# Patient Record
Sex: FEMALE | URBAN - METROPOLITAN AREA
[De-identification: names, ages, dates, MRNs, and addresses within clinical notes are randomized per-mention and may not be internally consistent; named-entity substitution may affect disease eponyms.]

---

## 2019-04-10 NOTE — PATIENT DISCUSSION
The patient was here for a vision care examination. There were no untoward findings noted. Repeat evaluation in one year is indicated.

## 2019-09-10 ENCOUNTER — PREPPED CHART (OUTPATIENT)
Dept: URBAN - METROPOLITAN AREA CLINIC 43 | Facility: CLINIC | Age: 64
End: 2019-09-10

## 2022-10-24 ENCOUNTER — NEW PATIENT (OUTPATIENT)
Dept: URBAN - METROPOLITAN AREA CLINIC 38 | Facility: CLINIC | Age: 67
End: 2022-10-24

## 2022-10-24 DIAGNOSIS — H43.811: ICD-10-CM

## 2022-10-24 DIAGNOSIS — H25.813: ICD-10-CM

## 2022-10-24 DIAGNOSIS — H04.123: ICD-10-CM

## 2022-10-24 DIAGNOSIS — H52.7: ICD-10-CM

## 2022-10-24 PROCEDURE — 99204 OFFICE O/P NEW MOD 45 MIN: CPT

## 2022-10-24 PROCEDURE — 92134 CPTRZ OPH DX IMG PST SGM RTA: CPT

## 2022-10-24 PROCEDURE — 92015 DETERMINE REFRACTIVE STATE: CPT

## 2022-10-24 ASSESSMENT — VISUAL ACUITY
OD_PH: 20/60-1
OS_BAT: 20/60
OS_SC: 20/200
OU_CC: J12-
OU_SC: J10
OD_CC: J12-
OD_SC: J10
OS_CC: 20/200
OS_SC: J10
OU_CC: 20/100
OD_SC: 20/200
OS_PH: 20/40
OU_SC: 20/100-1
OD_CC: 20/200
OS_CC: J12-
OD_BAT: 20/80-

## 2022-10-24 ASSESSMENT — TONOMETRY
OD_IOP_MMHG: 14
OS_IOP_MMHG: 13

## 2022-11-15 ENCOUNTER — CONSULTATION/EVALUATION (OUTPATIENT)
Dept: URBAN - METROPOLITAN AREA CLINIC 43 | Facility: CLINIC | Age: 67
End: 2022-11-15

## 2022-11-15 DIAGNOSIS — H25.813: ICD-10-CM

## 2022-11-15 DIAGNOSIS — H52.7: ICD-10-CM

## 2022-11-15 DIAGNOSIS — H04.123: ICD-10-CM

## 2022-11-15 DIAGNOSIS — H43.811: ICD-10-CM

## 2022-11-15 PROCEDURE — 92025 CPTRIZED CORNEAL TOPOGRAPHY: CPT

## 2022-11-15 PROCEDURE — V2799PMN IMPRIMIS PRED-MOXI-NEPAF 5ML

## 2022-11-15 PROCEDURE — 99214 OFFICE O/P EST MOD 30 MIN: CPT

## 2022-11-15 PROCEDURE — 92136TC INTERFEROMETRY - TECHNICAL COMPONENT

## 2022-11-15 ASSESSMENT — TONOMETRY
OS_IOP_MMHG: 18
OD_IOP_MMHG: 18

## 2022-11-15 ASSESSMENT — VISUAL ACUITY
OS_SC: 20/200+2
OS_SC: J5
OD_SC: 20/200+2
OD_SC: J6

## 2022-11-18 ENCOUNTER — PRE-OP/H&P (OUTPATIENT)
Dept: URBAN - METROPOLITAN AREA SURGERY 14 | Facility: SURGERY | Age: 67
End: 2022-11-18

## 2022-11-18 ENCOUNTER — TECH ONLY (OUTPATIENT)
Dept: URBAN - METROPOLITAN AREA CLINIC 39 | Facility: CLINIC | Age: 67
End: 2022-11-18

## 2022-11-18 ENCOUNTER — SURGERY/PROCEDURE (OUTPATIENT)
Dept: URBAN - METROPOLITAN AREA CLINIC 43 | Facility: CLINIC | Age: 67
End: 2022-11-18

## 2022-11-18 DIAGNOSIS — H52.7: ICD-10-CM

## 2022-11-18 DIAGNOSIS — H25.813: ICD-10-CM

## 2022-11-18 DIAGNOSIS — Z96.1: ICD-10-CM

## 2022-11-18 DIAGNOSIS — H04.123: ICD-10-CM

## 2022-11-18 DIAGNOSIS — H43.811: ICD-10-CM

## 2022-11-18 PROCEDURE — 99211HP H&P OFFICE/OUTPATIENT VISIT, EST

## 2022-11-18 PROCEDURE — 99211T TECH SERVICE

## 2022-11-18 PROCEDURE — 66984 XCAPSL CTRC RMVL W/O ECP: CPT

## 2022-11-18 ASSESSMENT — VISUAL ACUITY
OD_SC: 20/100
OD_SC: J12

## 2022-11-18 ASSESSMENT — TONOMETRY: OD_IOP_MMHG: 13

## 2022-11-21 ENCOUNTER — POST-OP (OUTPATIENT)
Dept: URBAN - METROPOLITAN AREA CLINIC 38 | Facility: CLINIC | Age: 67
End: 2022-11-21

## 2022-11-21 DIAGNOSIS — Z96.1: ICD-10-CM

## 2022-11-21 PROCEDURE — 99024 POSTOP FOLLOW-UP VISIT: CPT

## 2022-11-21 ASSESSMENT — TONOMETRY
OS_IOP_MMHG: 14
OD_IOP_MMHG: 14

## 2022-11-21 ASSESSMENT — VISUAL ACUITY
OD_SC: J3
OS_SC: J4
OD_SC: 20/60+2
OS_SC: 20/40-2

## 2022-11-28 ENCOUNTER — POST OP/EVAL OF SECOND EYE (OUTPATIENT)
Dept: URBAN - METROPOLITAN AREA CLINIC 38 | Facility: CLINIC | Age: 67
End: 2022-11-28

## 2022-11-28 DIAGNOSIS — H25.812: ICD-10-CM

## 2022-11-28 PROCEDURE — 99213 OFFICE O/P EST LOW 20 MIN: CPT

## 2022-11-28 ASSESSMENT — VISUAL ACUITY
OD_SC: J2
OS_SC: J4
OD_SC: 20/80
OS_SC: 20/40

## 2022-11-28 ASSESSMENT — TONOMETRY
OS_IOP_MMHG: 18
OD_IOP_MMHG: 18

## 2022-12-02 ENCOUNTER — POST-OP (OUTPATIENT)
Dept: URBAN - METROPOLITAN AREA SURGERY 14 | Facility: SURGERY | Age: 67
End: 2022-12-02

## 2022-12-02 ENCOUNTER — SURGERY/PROCEDURE (OUTPATIENT)
Dept: URBAN - METROPOLITAN AREA CLINIC 43 | Facility: CLINIC | Age: 67
End: 2022-12-02

## 2022-12-02 PROCEDURE — 66984 XCAPSL CTRC RMVL W/O ECP: CPT

## 2022-12-02 PROCEDURE — 99211T TECH SERVICE

## 2022-12-02 ASSESSMENT — TONOMETRY: OS_IOP_MMHG: 14

## 2022-12-02 ASSESSMENT — VISUAL ACUITY: OS_SC: 20/100

## 2022-12-05 ENCOUNTER — POST-OP (OUTPATIENT)
Dept: URBAN - METROPOLITAN AREA CLINIC 38 | Facility: CLINIC | Age: 67
End: 2022-12-05

## 2022-12-05 PROCEDURE — 99024 POSTOP FOLLOW-UP VISIT: CPT

## 2022-12-05 ASSESSMENT — TONOMETRY
OD_IOP_MMHG: 15
OS_IOP_MMHG: 15

## 2022-12-05 ASSESSMENT — VISUAL ACUITY
OU_SC: 20/80
OD_SC: 20/80
OS_SC: 20/80

## 2023-01-06 ENCOUNTER — POST-OP (OUTPATIENT)
Dept: URBAN - METROPOLITAN AREA CLINIC 38 | Facility: CLINIC | Age: 68
End: 2023-01-06

## 2023-01-06 DIAGNOSIS — H52.203: ICD-10-CM

## 2023-01-06 DIAGNOSIS — H52.4: ICD-10-CM

## 2023-01-06 DIAGNOSIS — Z96.1: ICD-10-CM

## 2023-01-06 DIAGNOSIS — H52.13: ICD-10-CM

## 2023-01-06 PROCEDURE — 92015 DETERMINE REFRACTIVE STATE: CPT

## 2023-01-06 PROCEDURE — 99024 POSTOP FOLLOW-UP VISIT: CPT

## 2023-01-06 ASSESSMENT — TONOMETRY
OD_IOP_MMHG: 16
OS_IOP_MMHG: 16

## 2023-01-06 ASSESSMENT — VISUAL ACUITY
OS_SC: J2
OD_SC: J2
OD_SC: 20/100
OS_SC: 20/100